# Patient Record
Sex: FEMALE | Race: WHITE | NOT HISPANIC OR LATINO | Employment: OTHER | URBAN - METROPOLITAN AREA
[De-identification: names, ages, dates, MRNs, and addresses within clinical notes are randomized per-mention and may not be internally consistent; named-entity substitution may affect disease eponyms.]

---

## 2017-06-14 ENCOUNTER — HOSPITAL ENCOUNTER (OUTPATIENT)
Dept: RADIOLOGY | Facility: MEDICAL CENTER | Age: 77
End: 2017-06-14
Attending: INTERNAL MEDICINE
Payer: MEDICARE

## 2017-06-14 DIAGNOSIS — C50.411 MALIGNANT NEOPLASM OF UPPER-OUTER QUADRANT OF RIGHT FEMALE BREAST (HCC): ICD-10-CM

## 2017-06-14 DIAGNOSIS — I82.442 DEEP VEIN THROMBOSIS (DVT) OF TIBIAL VEIN OF LEFT LOWER EXTREMITY, UNSPECIFIED CHRONICITY (HCC): ICD-10-CM

## 2017-06-14 PROCEDURE — 93971 EXTREMITY STUDY: CPT | Mod: LT

## 2020-03-20 ENCOUNTER — HOSPITAL ENCOUNTER (OUTPATIENT)
Dept: RADIOLOGY | Facility: MEDICAL CENTER | Age: 80
End: 2020-03-20
Attending: INTERNAL MEDICINE
Payer: MEDICARE

## 2020-03-20 DIAGNOSIS — C50.411 MALIGNANT NEOPLASM OF UPPER-OUTER QUADRANT OF RIGHT FEMALE BREAST, UNSPECIFIED ESTROGEN RECEPTOR STATUS (HCC): ICD-10-CM

## 2020-03-20 PROCEDURE — A9552 F18 FDG: HCPCS

## 2020-05-28 ENCOUNTER — APPOINTMENT (OUTPATIENT)
Dept: RADIOLOGY | Facility: MEDICAL CENTER | Age: 80
End: 2020-05-28
Attending: INTERNAL MEDICINE
Payer: MEDICARE

## 2020-05-28 DIAGNOSIS — Z17.0 MALIGNANT NEOPLASM OF UPPER-OUTER QUADRANT OF RIGHT BREAST IN FEMALE, ESTROGEN RECEPTOR POSITIVE (HCC): ICD-10-CM

## 2020-05-28 DIAGNOSIS — C50.411 MALIGNANT NEOPLASM OF UPPER-OUTER QUADRANT OF RIGHT BREAST IN FEMALE, ESTROGEN RECEPTOR POSITIVE (HCC): ICD-10-CM

## 2020-05-28 PROCEDURE — A9576 INJ PROHANCE MULTIPACK: HCPCS | Performed by: INTERNAL MEDICINE

## 2020-05-28 PROCEDURE — 700117 HCHG RX CONTRAST REV CODE 255: Performed by: INTERNAL MEDICINE

## 2020-05-28 PROCEDURE — 72197 MRI PELVIS W/O & W/DYE: CPT

## 2020-05-28 RX ADMIN — GADOTERIDOL 15 ML: 279.3 INJECTION, SOLUTION INTRAVENOUS at 13:33

## 2020-06-10 RX ORDER — SODIUM CHLORIDE 9 MG/ML
INJECTION, SOLUTION INTRAVENOUS CONTINUOUS
Status: CANCELLED | OUTPATIENT
Start: 2020-06-16

## 2020-06-11 ENCOUNTER — OFFICE VISIT (OUTPATIENT)
Dept: ADMISSIONS | Facility: MEDICAL CENTER | Age: 80
End: 2020-06-11
Attending: INTERNAL MEDICINE
Payer: MEDICARE

## 2020-06-11 DIAGNOSIS — Z01.812 PRE-OPERATIVE LABORATORY EXAMINATION: ICD-10-CM

## 2020-06-11 LAB — COVID ORDER STATUS COVID19: NORMAL

## 2020-06-11 PROCEDURE — C9803 HOPD COVID-19 SPEC COLLECT: HCPCS

## 2020-06-15 LAB
SARS-COV-2 RNA RESP QL NAA+PROBE: NOT DETECTED
SPECIMEN SOURCE: NORMAL

## 2020-06-15 RX ORDER — ASPIRIN 81 MG/1
81 TABLET, CHEWABLE ORAL DAILY
COMMUNITY

## 2020-06-15 NOTE — OR NURSING
COVID-19 Pre-Surgery Screenin. Do you have an undiagnosed respiratory illness or symptoms such as coughing or sneezing? (Yes/No) n     1. Onset of Sx: na     2. Acute vs. chronic respiratory illness: na     2. Do you have an unexplained fever greater than 100.4 degrees Fahrenheit or 38 degrees Celsius? (Yes/No) n  ?  3. Have you had direct exposure to a patient who tested positive for Covid-19? (Yes/No) n    4. Have you traveled within the last 14 days to [place], [place], [place], [place], or [place]? (Yes/No) n    Patient informed regarding visitation policy by this RN.

## 2020-06-16 ENCOUNTER — APPOINTMENT (OUTPATIENT)
Dept: RADIOLOGY | Facility: MEDICAL CENTER | Age: 80
End: 2020-06-16
Attending: INTERNAL MEDICINE
Payer: MEDICARE

## 2020-06-16 ENCOUNTER — HOSPITAL ENCOUNTER (OUTPATIENT)
Facility: MEDICAL CENTER | Age: 80
End: 2020-06-16
Attending: INTERNAL MEDICINE | Admitting: INTERNAL MEDICINE
Payer: MEDICARE

## 2020-06-16 VITALS
SYSTOLIC BLOOD PRESSURE: 137 MMHG | HEIGHT: 67 IN | BODY MASS INDEX: 26.57 KG/M2 | TEMPERATURE: 97.1 F | RESPIRATION RATE: 16 BRPM | WEIGHT: 169.31 LBS | HEART RATE: 71 BPM | OXYGEN SATURATION: 93 % | DIASTOLIC BLOOD PRESSURE: 69 MMHG

## 2020-06-16 DIAGNOSIS — C50.411 MALIGNANT NEOPLASM OF UPPER-OUTER QUADRANT OF RIGHT FEMALE BREAST, UNSPECIFIED ESTROGEN RECEPTOR STATUS (HCC): ICD-10-CM

## 2020-06-16 LAB
ERYTHROCYTE [DISTWIDTH] IN BLOOD BY AUTOMATED COUNT: 47.5 FL (ref 35.9–50)
HCT VFR BLD AUTO: 43.2 % (ref 37–47)
HGB BLD-MCNC: 14.5 G/DL (ref 12–16)
INR PPP: 0.98 (ref 0.87–1.13)
MCH RBC QN AUTO: 31.7 PG (ref 27–33)
MCHC RBC AUTO-ENTMCNC: 33.6 G/DL (ref 33.6–35)
MCV RBC AUTO: 94.5 FL (ref 81.4–97.8)
PATHOLOGY CONSULT NOTE: NORMAL
PLATELET # BLD AUTO: 137 K/UL (ref 164–446)
PMV BLD AUTO: 11.1 FL (ref 9–12.9)
PROTHROMBIN TIME: 13.1 SEC (ref 12–14.6)
RBC # BLD AUTO: 4.57 M/UL (ref 4.2–5.4)
WBC # BLD AUTO: 17 K/UL (ref 4.8–10.8)

## 2020-06-16 PROCEDURE — 99153 MOD SED SAME PHYS/QHP EA: CPT

## 2020-06-16 PROCEDURE — 700111 HCHG RX REV CODE 636 W/ 250 OVERRIDE (IP): Performed by: RADIOLOGY

## 2020-06-16 PROCEDURE — 85027 COMPLETE CBC AUTOMATED: CPT

## 2020-06-16 PROCEDURE — 88185 FLOWCYTOMETRY/TC ADD-ON: CPT | Mod: 91

## 2020-06-16 PROCEDURE — 88311 DECALCIFY TISSUE: CPT

## 2020-06-16 PROCEDURE — 88307 TISSUE EXAM BY PATHOLOGIST: CPT

## 2020-06-16 PROCEDURE — 88341 IMHCHEM/IMCYTCHM EA ADD ANTB: CPT | Mod: 91

## 2020-06-16 PROCEDURE — 700105 HCHG RX REV CODE 258: Performed by: NURSE PRACTITIONER

## 2020-06-16 PROCEDURE — 85610 PROTHROMBIN TIME: CPT

## 2020-06-16 PROCEDURE — 88342 IMHCHEM/IMCYTCHM 1ST ANTB: CPT

## 2020-06-16 PROCEDURE — 700111 HCHG RX REV CODE 636 W/ 250 OVERRIDE (IP)

## 2020-06-16 PROCEDURE — 160002 HCHG RECOVERY MINUTES (STAT)

## 2020-06-16 PROCEDURE — 88184 FLOWCYTOMETRY/ TC 1 MARKER: CPT

## 2020-06-16 RX ORDER — MIDAZOLAM HYDROCHLORIDE 1 MG/ML
INJECTION INTRAMUSCULAR; INTRAVENOUS
Status: COMPLETED
Start: 2020-06-16 | End: 2020-06-16

## 2020-06-16 RX ORDER — ONDANSETRON 2 MG/ML
4 INJECTION INTRAMUSCULAR; INTRAVENOUS PRN
Status: DISCONTINUED | OUTPATIENT
Start: 2020-06-16 | End: 2020-06-16 | Stop reason: HOSPADM

## 2020-06-16 RX ORDER — MIDAZOLAM HYDROCHLORIDE 1 MG/ML
.5-2 INJECTION INTRAMUSCULAR; INTRAVENOUS PRN
Status: DISCONTINUED | OUTPATIENT
Start: 2020-06-16 | End: 2020-06-16 | Stop reason: HOSPADM

## 2020-06-16 RX ORDER — HYDROMORPHONE HYDROCHLORIDE 2 MG/ML
0.25 INJECTION, SOLUTION INTRAMUSCULAR; INTRAVENOUS; SUBCUTANEOUS
Status: DISCONTINUED | OUTPATIENT
Start: 2020-06-16 | End: 2020-06-16 | Stop reason: HOSPADM

## 2020-06-16 RX ORDER — NALOXONE HYDROCHLORIDE 0.4 MG/ML
INJECTION, SOLUTION INTRAMUSCULAR; INTRAVENOUS; SUBCUTANEOUS
Status: DISCONTINUED
Start: 2020-06-16 | End: 2020-06-16 | Stop reason: HOSPADM

## 2020-06-16 RX ORDER — ONDANSETRON 2 MG/ML
4 INJECTION INTRAMUSCULAR; INTRAVENOUS EVERY 8 HOURS PRN
Status: DISCONTINUED | OUTPATIENT
Start: 2020-06-16 | End: 2020-06-16 | Stop reason: HOSPADM

## 2020-06-16 RX ORDER — OXYCODONE HYDROCHLORIDE 5 MG/1
2.5 TABLET ORAL
Status: DISCONTINUED | OUTPATIENT
Start: 2020-06-16 | End: 2020-06-16 | Stop reason: HOSPADM

## 2020-06-16 RX ORDER — SODIUM CHLORIDE 9 MG/ML
500 INJECTION, SOLUTION INTRAVENOUS
Status: DISCONTINUED | OUTPATIENT
Start: 2020-06-16 | End: 2020-06-16 | Stop reason: HOSPADM

## 2020-06-16 RX ORDER — SODIUM CHLORIDE 9 MG/ML
INJECTION, SOLUTION INTRAVENOUS CONTINUOUS
Status: DISCONTINUED | OUTPATIENT
Start: 2020-06-16 | End: 2020-06-16 | Stop reason: HOSPADM

## 2020-06-16 RX ADMIN — MIDAZOLAM HYDROCHLORIDE 1 MG: 1 INJECTION, SOLUTION INTRAMUSCULAR; INTRAVENOUS at 14:20

## 2020-06-16 RX ADMIN — SODIUM CHLORIDE: 9 INJECTION, SOLUTION INTRAVENOUS at 12:15

## 2020-06-16 RX ADMIN — FENTANYL CITRATE 50 MCG: 50 INJECTION INTRAMUSCULAR; INTRAVENOUS at 14:03

## 2020-06-16 RX ADMIN — MIDAZOLAM HYDROCHLORIDE 1 MG: 1 INJECTION, SOLUTION INTRAMUSCULAR; INTRAVENOUS at 14:03

## 2020-06-16 RX ADMIN — FENTANYL CITRATE 25 MCG: 50 INJECTION INTRAMUSCULAR; INTRAVENOUS at 14:11

## 2020-06-16 NOTE — PROGRESS NOTES
Patient is AO x 4, RASS 0, and denies pain/nausea.  VSS on RA.  Procedure site CDI.  POC reviewed, PIV verified, and safety protocols in place.  Patient tolerating PO fluids.  Daughter Zoe updated on patient status and notified to p/u patient at [facility] ~1645.

## 2020-06-16 NOTE — DISCHARGE INSTRUCTIONS
ACTIVITY: Rest and take it easy for the first 24 hours.  A responsible adult is recommended to remain with you during that time.  It is normal to feel sleepy.  We encourage you to not do anything that requires balance, judgment or coordination.    MILD FLU-LIKE SYMPTOMS ARE NORMAL. YOU MAY EXPERIENCE GENERALIZED MUSCLE ACHES, THROAT IRRITATION, HEADACHE AND/OR SOME NAUSEA.    FOR 24 HOURS DO NOT:  Drive, operate machinery or run household appliances.  Drink beer or alcoholic beverages.   Make important decisions or sign legal documents.    SPECIAL INSTRUCTIONS:   No lifting over 10 lbs for 5 days.  Do not soak site in hot tub, bath tub or swimming pool for 5 days  Needle Biopsy of the Bone  Introduction  A bone biopsy is a procedure in which a small sample of bone is removed. The sample is taken with a needle. Then, the bone sample is looked at under a microscope to check for abnormalities. The sample is usually taken from a bone that is close to the skin. This procedure may be done to check for various problems with the bone. You may need this procedure if imaging tests or blood tests have indicated a possible problem. This procedure may be done to help determine if a bone tumor is cancerous (malignant). A bone biopsy can help to diagnose problems such as:  · Tumors of the bone (sarcomas) and bone marrow (multiple myeloma).  · Bone that forms abnormally (Paget disease).  · Noncancerous (benign) bone cysts.  · Bony growths.  · Infections in the bone.  What are the risks?  Generally, this is a safe procedure. However, problems may occur, including:  · Excessive bleeding.  · Infection.  · Injury to surrounding tissue.  What happens before the procedure?  · Ask your health care provider about:  ¨ Changing or stopping your regular medicines. This is especially important if you are taking diabetes medicines or blood thinners.  ¨ Taking medicines such as aspirin and ibuprofen. These medicines can thin your blood. Do not  take these medicines before your procedure if your health care provider instructs you not to.  · Follow instructions from your health care provider about eating or drinking restrictions.  · Plan to have someone take you home after the procedure.  · If you go home right after the procedure, plan to have someone with you for 24 hours.  What happens during the procedure?  · An IV tube may be inserted into one of your veins.  · The injection site will be cleaned with a germ-killing solution (antiseptic).  · You will be given one or more of the following:  ¨ A medicine to help you relax (sedative).  ¨ A medicine to numb the area (local anesthetic).  · The sample of bone will be removed by putting a large needle through the skin and into the bone.  · The needle will be removed.  · A bandage (dressing) will be placed over the insertion site and taped in place.  The procedure may vary among health care providers and hospitals.  What happens after the procedure?  · Your blood pressure, heart rate, breathing rate, and blood oxygen level will be monitored often until the medicines you were given have worn off.  · Return to your normal activities as told by your health care provider.  Recovering at home  Once at home, follow any instructions you’re given:   · Take all medicines as directed.  · Care for the procedure site as instructed.  · Check for signs of infection at the procedure site (see below).  · Don't get the procedure site wet. Don't bathe or shower until your healthcare providers say it is OK to do so.  · Don't do any heavy lifting or other strenuous activities as directed.   Call the healthcare provider  Call your healthcare provider if you have any of the following:   · Fever of 100.4 ºF ( 38 ºC) or higher, or as directed by your healthcare provider   · Signs of infection at the procedure site, such as increased redness or swelling, warmth, or bad-smelling drainage   · Prolonged bleeding from the biopsy site    · Increasing pain, swelling, or numbness around the biopsy site   · New pain, numbness, or weakness in your leg on the side of the biopsy.     This information is not intended to replace advice given to you by your health care provider. Make sure you discuss any questions you have with your health care provider.  Document Released: 10/26/2005 Document Revised: 05/25/2017 Document Reviewed: 01/25/2016  © 2017 Elsevier    DIET: To avoid nausea, slowly advance diet as tolerated, avoiding spicy or greasy foods for the first day.  Add more substantial food to your diet according to your physician's instructions. INCREASE FLUIDS AND FIBER TO AVOID CONSTIPATION.    SURGICAL DRESSING/BATHING: leave dressing in place for 24 hours, may shower once removed     FOLLOW-UP APPOINTMENT:  A follow-up appointment should be arranged with your Dr. Garces [phone]; call to schedule.    You should CALL YOUR PHYSICIAN if you develop:  Fever greater than 101 degrees F.  Pain not relieved by medication, or persistent nausea or vomiting.  Excessive bleeding (blood soaking through dressing) or unexpected drainage from the wound.  Extreme redness or swelling around the incision site, drainage of pus or foul smelling drainage.  Inability to urinate or empty your bladder within 8 hours.  Problems with breathing or chest pain.    You should call 911 if you develop problems with breathing or chest pain.  If you are unable to contact your doctor or surgical center, you should go to the nearest emergency room or urgent care center.  Physician's telephone #: [phone]    If any questions arise, call your doctor.  If your doctor is not available, please feel free to call the Surgical Center at [phone].  The Center is open Monday through Friday from 7AM to 7PM.  You can also call the [facility] HOTLINE open 24 hours/day, 7 days/week and speak to a nurse at [phone], or toll free at [phone].    A registered nurse may call you a few  days after your surgery to see how you are doing after your procedure.    MEDICATIONS: Resume taking daily medication.  Take prescribed pain medication with food.  If no medication is prescribed, you may take non-aspirin pain medication if needed.  PAIN MEDICATION CAN BE VERY CONSTIPATING.  Take a stool softener or laxative such as senokot, pericolace, or milk of magnesia if needed.    If your physician has prescribed pain medication that includes Acetaminophen (Tylenol), do not take additional Acetaminophen (Tylenol) while taking the prescribed medication.    Depression / Suicide Risk    As you are discharged from this [facility] facility, it is important to learn how to keep safe from harming yourself.    Recognize the warning signs:  · Abrupt changes in personality, positive or negative- including increase in energy   · Giving away possessions  · Change in eating patterns- significant weight changes-  positive or negative  · Change in sleeping patterns- unable to sleep or sleeping all the time   · Unwillingness or inability to communicate  · Depression  · Unusual sadness, discouragement and loneliness  · Talk of wanting to die  · Neglect of personal appearance   · Rebelliousness- reckless behavior  · Withdrawal from people/activities they love  · Confusion- inability to concentrate     If you or a loved one observes any of these behaviors or has concerns about self-harm, here's what you can do:  · Talk about it- your feelings and reasons for harming yourself  · Remove any means that you might use to hurt yourself (examples: pills, rope, extension cords, firearm)  · Get professional help from the community (Mental Health, Substance Abuse, psychological counseling)  · Do not be alone:Call your Safe Contact- someone whom you trust who will be there for you.  · Call your local CRISIS HOTLINE 481-7463 or 458-706-1549  · Call your local Children's Mobile Crisis Response Team [place] (453) 833-1511 or  www.[facility]  · Call the toll free National Suicide Prevention Hotlines   · National Suicide Prevention Lifeline 711-561-MYAZ (6783)  · National Hope Line Network 800-SUICIDE (843-7781)

## 2020-06-16 NOTE — PROGRESS NOTES
IR Nursing Note    Patient underwent a biopsy of the right iliac crest region by Dr. Bello. Procedure site was marked by MD and verified using imaging guidance. Patient was placed in a prone position. Vitals were taken every 5 minutes and remained stable during procedure (see doc flow sheet for results). CO2 waveform capnography was monitored and remained 17-38 throughout procedure. Patient appeared to tolerate the procedure well. A gauze dressing with tegadermwas placed over surgical site. Report called to Carlos CHRISTIANSEN. Pt transported by ruddy with RN to [facility] PACU. Core Labs X 10 hand delivered to lab by Kimberly MURILLO; 8 in fornalin, 2 in RPMI

## 2020-06-16 NOTE — H&P
History and Physical    Date: 6/16/2020    PCP: Rema Bowens M.D.      CC: RIGHT ILIAC BONE LESION ON CT-PET AND MRI    HPI: This is a 80 y.o. female who is presenting FOR CT GUIDED BONE BIOPSY RIGHT ILIAC BONE    Past Medical History:   Diagnosis Date   • Arthritis    • Blood clotting disorder (HCC)     clot behind left knee   • Bowel habit changes     constipation sometimes   • Cancer (HCC)     right breast   • CATARACT 2008    removed bilat   • Clostridium difficile infection 2010   • Diabetes     diet and exercise controlled, no meds   • Factor V deficiency (HCC)    • Gynecological disorder     hysterectomy, one ovary   • Pain     arthritis and back, 5/10   • Pneumonia    • Urinary bladder disorder     frequent UTI       Past Surgical History:   Procedure Laterality Date   • LUMPECTOMY  6/22/2012    Performed by JON LYONS at [facility]   • NODE BIOPSY SENTINEL  6/22/2012    Performed by JON LYONS at [facility]   • AXILLARY NODE DISSECTION  6/22/2012    Performed by JON LYONS at [facility]   • BREAST BIOPSY  06-06-12   • CATARACT EXTRACTION WITH IOL Bilateral 2008   • HYSTERECTOMY LAPAROSCOPY     • MENISCUS REPAIR Right    • NEUROMA EXCISION      foot       Current Facility-Administered Medications   Medication Dose Route Frequency Provider Last Rate Last Dose   • NS infusion   Intravenous Continuous Paulette L Raji, A.P.N. 125 mL/hr at 06/16/20 1215          Social History     Socioeconomic History   • Marital status:      Spouse name: Not on file   • Number of children: Not on file   • Years of education: Not on file   • Highest education level: Not on file   Occupational History   • Not on file   Social Needs   • Financial resource strain: Not on file   • Food insecurity     Worry: Not on file     Inability: Not on file   • Transportation needs     Medical: Not on file     Non-medical: Not on file   Tobacco Use   •  Smoking status: Former Smoker     Packs/day: 2.00     Years: 33.00     Pack years: 66.00     Last attempt to quit: 1990     Years since quittin.4   • Smokeless tobacco: Never Used   Substance and Sexual Activity   • Alcohol use: No   • Drug use: No   • Sexual activity: Not on file   Lifestyle   • Physical activity     Days per week: Not on file     Minutes per session: Not on file   • Stress: Not on file   Relationships   • Social connections     Talks on phone: Not on file     Gets together: Not on file     Attends Cheondoism service: Not on file     Active member of club or organization: Not on file     Attends meetings of clubs or organizations: Not on file     Relationship status: Not on file   • Intimate partner violence     Fear of current or ex partner: Not on file     Emotionally abused: Not on file     Physically abused: Not on file     Forced sexual activity: Not on file   Other Topics Concern   • Not on file   Social History Narrative   • Not on file       History reviewed. No pertinent family history.    Allergies:  Other drug; Other misc; and Tape    Review of Systems:  Negative except for GIVES HX OF PNEUMONIA, SEPSIS, UTI IN FEBRUARY. WONDERS IF SHE HAD COVID. NOW HAS ONLY MILD FATIGUE. ALSO GIVES HX OF BREAST CA AND CLL. DENIES PELVIC BONE PAIN.     Physical Exam   Constitutional: She appears well-developed and well-nourished.   HENT:   Head: Normocephalic and atraumatic.   Cardiovascular: Regular rhythm.   Pulmonary/Chest: Breath sounds normal.   Neurological: She is alert.   Psychiatric: She has a normal mood and affect. Her behavior is normal.       Vital Signs  Blood Pressure : 144/75   Temperature: 36.8 °C (98.3 °F)   Pulse: 85   Respiration: 18   Pulse Oximetry: 95 %        Labs:  Recent Labs     20  1218   WBC 17.0*   RBC 4.57   HEMOGLOBIN 14.5   HEMATOCRIT 43.2   MCV 94.5   MCH 31.7   MCHC 33.6   RDW 47.5   PLATELETCT 137*   MPV 11.1         Recent Labs     20  1218   INR  0.98     Recent Labs     06/16/20  1218   INR 0.98       Radiology:  No orders to display             Assessment and Plan:This is a 80 y.o. HERE FOR CT GUIDED PELVIC BONE BX

## 2020-06-16 NOTE — OR SURGEON
Immediate Post- Operative Note        PostOp Diagnosis: RIGHT ILIAC BONE LESION WITH MARROW REPLACEMENT. HX OF CLL AND BREAST CA      Procedure(s): CT GUIDED DEEP BONE BX AND MARROW ASPIRATE    14G TREPHINE CORES X 3 IN FORMALIN    18G CORES X 5 (FORMALIN X4, RPMI X 1)    13G ATTEMPTED CORE (MARROW CLOT ASPIRATE) IN RPMI    10 ML MARROW ASPIRATE SUBMITTED TO PATHOLOGY BONE MARROW TECH      Estimated Blood Loss: Less than 20 ml        Complications: None            6/16/2020     2:34 PM     Karlos Bello M.D.

## 2020-12-16 ENCOUNTER — HOSPITAL ENCOUNTER (OUTPATIENT)
Dept: RADIOLOGY | Facility: MEDICAL CENTER | Age: 80
End: 2020-12-16
Attending: INTERNAL MEDICINE
Payer: MEDICARE

## 2020-12-16 ENCOUNTER — APPOINTMENT (OUTPATIENT)
Dept: RADIOLOGY | Facility: MEDICAL CENTER | Age: 80
End: 2020-12-16
Attending: EMERGENCY MEDICINE
Payer: MEDICARE

## 2020-12-16 ENCOUNTER — HOSPITAL ENCOUNTER (OUTPATIENT)
Facility: MEDICAL CENTER | Age: 80
End: 2020-12-17
Attending: EMERGENCY MEDICINE | Admitting: STUDENT IN AN ORGANIZED HEALTH CARE EDUCATION/TRAINING PROGRAM
Payer: MEDICARE

## 2020-12-16 DIAGNOSIS — R06.02 SHORTNESS OF BREATH: ICD-10-CM

## 2020-12-16 DIAGNOSIS — R07.9 CHEST PAIN, UNSPECIFIED TYPE: ICD-10-CM

## 2020-12-16 DIAGNOSIS — C50.411 MALIGNANT NEOPLASM OF UPPER-OUTER QUADRANT OF RIGHT FEMALE BREAST, UNSPECIFIED ESTROGEN RECEPTOR STATUS (HCC): ICD-10-CM

## 2020-12-16 DIAGNOSIS — I26.99 PULMONARY EMBOLISM, UNSPECIFIED CHRONICITY, UNSPECIFIED PULMONARY EMBOLISM TYPE, UNSPECIFIED WHETHER ACUTE COR PULMONALE PRESENT (HCC): ICD-10-CM

## 2020-12-16 PROBLEM — C91.10 CLL (CHRONIC LYMPHOCYTIC LEUKEMIA) (HCC): Status: ACTIVE | Noted: 2020-12-16

## 2020-12-16 PROBLEM — E87.1 HYPONATREMIA: Status: ACTIVE | Noted: 2020-12-16

## 2020-12-16 PROBLEM — R79.89 ELEVATED TROPONIN: Status: ACTIVE | Noted: 2020-12-16

## 2020-12-16 LAB
ALBUMIN SERPL BCP-MCNC: 4.1 G/DL (ref 3.2–4.9)
ALBUMIN/GLOB SERPL: 1.9 G/DL
ALP SERPL-CCNC: 76 U/L (ref 30–99)
ALT SERPL-CCNC: 13 U/L (ref 2–50)
ANION GAP SERPL CALC-SCNC: 12 MMOL/L (ref 7–16)
APTT PPP: 23.2 SEC (ref 24.7–36)
AST SERPL-CCNC: 18 U/L (ref 12–45)
BILIRUB SERPL-MCNC: 0.5 MG/DL (ref 0.1–1.5)
BUN SERPL-MCNC: 18 MG/DL (ref 8–22)
CALCIUM SERPL-MCNC: 8.9 MG/DL (ref 8.5–10.5)
CHLORIDE SERPL-SCNC: 101 MMOL/L (ref 96–112)
CO2 SERPL-SCNC: 21 MMOL/L (ref 20–33)
COVID ORDER STATUS COVID19: NORMAL
CREAT SERPL-MCNC: 1.06 MG/DL (ref 0.5–1.4)
EKG IMPRESSION: NORMAL
EKG IMPRESSION: NORMAL
FLUAV RNA SPEC QL NAA+PROBE: NEGATIVE
FLUBV RNA SPEC QL NAA+PROBE: NEGATIVE
GLOBULIN SER CALC-MCNC: 2.2 G/DL (ref 1.9–3.5)
GLUCOSE SERPL-MCNC: 97 MG/DL (ref 65–99)
INR PPP: 1 (ref 0.87–1.13)
POTASSIUM SERPL-SCNC: 4.7 MMOL/L (ref 3.6–5.5)
PROT SERPL-MCNC: 6.3 G/DL (ref 6–8.2)
PROTHROMBIN TIME: 13.5 SEC (ref 12–14.6)
RSV RNA SPEC QL NAA+PROBE: NEGATIVE
SARS-COV-2 RNA RESP QL NAA+PROBE: NOTDETECTED
SODIUM SERPL-SCNC: 134 MMOL/L (ref 135–145)
SPECIMEN SOURCE: NORMAL
TROPONIN T SERPL-MCNC: 22 NG/L (ref 6–19)

## 2020-12-16 PROCEDURE — 0241U HCHG SARS-COV-2 COVID-19 NFCT DS RESP RNA 4 TRGT MIC: CPT

## 2020-12-16 PROCEDURE — 700117 HCHG RX CONTRAST REV CODE 255: Performed by: INTERNAL MEDICINE

## 2020-12-16 PROCEDURE — G0378 HOSPITAL OBSERVATION PER HR: HCPCS

## 2020-12-16 PROCEDURE — 99285 EMERGENCY DEPT VISIT HI MDM: CPT

## 2020-12-16 PROCEDURE — 85610 PROTHROMBIN TIME: CPT

## 2020-12-16 PROCEDURE — 99218 PR INITIAL OBSERVATION CARE,LEVL I: CPT | Mod: AI | Performed by: STUDENT IN AN ORGANIZED HEALTH CARE EDUCATION/TRAINING PROGRAM

## 2020-12-16 PROCEDURE — 80053 COMPREHEN METABOLIC PANEL: CPT

## 2020-12-16 PROCEDURE — 84484 ASSAY OF TROPONIN QUANT: CPT | Mod: 91

## 2020-12-16 PROCEDURE — C9803 HOPD COVID-19 SPEC COLLECT: HCPCS | Performed by: EMERGENCY MEDICINE

## 2020-12-16 PROCEDURE — 85730 THROMBOPLASTIN TIME PARTIAL: CPT

## 2020-12-16 PROCEDURE — 93005 ELECTROCARDIOGRAM TRACING: CPT | Performed by: EMERGENCY MEDICINE

## 2020-12-16 PROCEDURE — 93005 ELECTROCARDIOGRAM TRACING: CPT

## 2020-12-16 PROCEDURE — 700111 HCHG RX REV CODE 636 W/ 250 OVERRIDE (IP): Performed by: EMERGENCY MEDICINE

## 2020-12-16 PROCEDURE — 71045 X-RAY EXAM CHEST 1 VIEW: CPT

## 2020-12-16 PROCEDURE — 71260 CT THORAX DX C+: CPT

## 2020-12-16 RX ORDER — ONDANSETRON 2 MG/ML
4 INJECTION INTRAMUSCULAR; INTRAVENOUS EVERY 4 HOURS PRN
Status: DISCONTINUED | OUTPATIENT
Start: 2020-12-16 | End: 2020-12-17 | Stop reason: HOSPADM

## 2020-12-16 RX ORDER — BISACODYL 10 MG
10 SUPPOSITORY, RECTAL RECTAL
Status: DISCONTINUED | OUTPATIENT
Start: 2020-12-16 | End: 2020-12-17 | Stop reason: HOSPADM

## 2020-12-16 RX ORDER — AMOXICILLIN 250 MG
2 CAPSULE ORAL 2 TIMES DAILY
Status: DISCONTINUED | OUTPATIENT
Start: 2020-12-16 | End: 2020-12-17 | Stop reason: HOSPADM

## 2020-12-16 RX ORDER — ACETAMINOPHEN 325 MG/1
650 TABLET ORAL EVERY 6 HOURS PRN
Status: DISCONTINUED | OUTPATIENT
Start: 2020-12-16 | End: 2020-12-17 | Stop reason: HOSPADM

## 2020-12-16 RX ORDER — LORATADINE 10 MG/1
10 TABLET ORAL DAILY
COMMUNITY

## 2020-12-16 RX ORDER — POLYETHYLENE GLYCOL 3350 17 G/17G
1 POWDER, FOR SOLUTION ORAL
Status: DISCONTINUED | OUTPATIENT
Start: 2020-12-16 | End: 2020-12-17 | Stop reason: HOSPADM

## 2020-12-16 RX ORDER — ONDANSETRON 4 MG/1
4 TABLET, ORALLY DISINTEGRATING ORAL EVERY 4 HOURS PRN
Status: DISCONTINUED | OUTPATIENT
Start: 2020-12-16 | End: 2020-12-17 | Stop reason: HOSPADM

## 2020-12-16 RX ORDER — FLUTICASONE PROPIONATE 50 MCG
2 SPRAY, SUSPENSION (ML) NASAL DAILY
COMMUNITY

## 2020-12-16 RX ADMIN — IOHEXOL 100 ML: 350 INJECTION, SOLUTION INTRAVENOUS at 11:34

## 2020-12-16 RX ADMIN — IOHEXOL 25 ML: 240 INJECTION, SOLUTION INTRATHECAL; INTRAVASCULAR; INTRAVENOUS; ORAL at 11:35

## 2020-12-16 RX ADMIN — ENOXAPARIN SODIUM 80 MG: 80 INJECTION SUBCUTANEOUS at 17:56

## 2020-12-16 NOTE — ED TRIAGE NOTES
"Chief Complaint   Patient presents with   • Shortness of Breath     X months, worse this week with fatigue with ADL's, states possible COPD. Denies    • Pulmonary Embolism     Seen today on CT, sent to ER by MD. Dx with DVT two years ago LLE. Pt only on baby aspirin, no other thinners, previously on xarelto. States some chest 'tightness'   • Sent by MD     For PE     Pt arrives from CT scan at this hospital by MD request for dx of PE, results in pt chart. VSS on RA, NAD, GCS 15.     [facility] Educ[facility] on [facility]age process and to inform staff of any changes.     Denies all s/sx of covid, denies recent travel, denies fevers.    BP (!) 170/87   Pulse 92   Temp 36.3 °C (97.3 °F) (Temporal)   Resp 16   Ht 1.676 m (5' 6\")   Wt 77.1 kg (170 lb)   SpO2 97%   BMI 27.44 kg/m²   "

## 2020-12-17 ENCOUNTER — PHARMACY VISIT (OUTPATIENT)
Dept: PHARMACY | Facility: MEDICAL CENTER | Age: 80
End: 2020-12-17
Payer: COMMERCIAL

## 2020-12-17 ENCOUNTER — APPOINTMENT (OUTPATIENT)
Dept: RADIOLOGY | Facility: MEDICAL CENTER | Age: 80
End: 2020-12-17
Attending: STUDENT IN AN ORGANIZED HEALTH CARE EDUCATION/TRAINING PROGRAM
Payer: MEDICARE

## 2020-12-17 VITALS
RESPIRATION RATE: 20 BRPM | SYSTOLIC BLOOD PRESSURE: 136 MMHG | OXYGEN SATURATION: 96 % | HEART RATE: 82 BPM | HEIGHT: 66 IN | TEMPERATURE: 98.1 F | WEIGHT: 170 LBS | BODY MASS INDEX: 27.32 KG/M2 | DIASTOLIC BLOOD PRESSURE: 74 MMHG

## 2020-12-17 PROBLEM — R79.89 ELEVATED TROPONIN: Status: RESOLVED | Noted: 2020-12-16 | Resolved: 2020-12-17

## 2020-12-17 LAB
ANION GAP SERPL CALC-SCNC: 8 MMOL/L (ref 7–16)
BASOPHILS # BLD AUTO: 0 % (ref 0–1.8)
BASOPHILS # BLD: 0 K/UL (ref 0–0.12)
BUN SERPL-MCNC: 19 MG/DL (ref 8–22)
CALCIUM SERPL-MCNC: 8.6 MG/DL (ref 8.5–10.5)
CHLORIDE SERPL-SCNC: 107 MMOL/L (ref 96–112)
CO2 SERPL-SCNC: 25 MMOL/L (ref 20–33)
CREAT SERPL-MCNC: 1.19 MG/DL (ref 0.5–1.4)
EOSINOPHIL # BLD AUTO: 0 K/UL (ref 0–0.51)
EOSINOPHIL NFR BLD: 0 % (ref 0–6.9)
ERYTHROCYTE [DISTWIDTH] IN BLOOD BY AUTOMATED COUNT: 49.4 FL (ref 35.9–50)
GLUCOSE SERPL-MCNC: 89 MG/DL (ref 65–99)
HCT VFR BLD AUTO: 38.2 % (ref 37–47)
HGB BLD-MCNC: 12.7 G/DL (ref 12–16)
LYMPHOCYTES # BLD AUTO: 17.03 K/UL (ref 1–4.8)
LYMPHOCYTES NFR BLD: 85.6 % (ref 22–41)
MANUAL DIFF BLD: NORMAL
MCH RBC QN AUTO: 32.3 PG (ref 27–33)
MCHC RBC AUTO-ENTMCNC: 33.2 G/DL (ref 33.6–35)
MCV RBC AUTO: 97.2 FL (ref 81.4–97.8)
MONOCYTES # BLD AUTO: 0.5 K/UL (ref 0–0.85)
MONOCYTES NFR BLD AUTO: 2.5 % (ref 0–13.4)
MORPHOLOGY BLD-IMP: NORMAL
MYELOCYTES NFR BLD MANUAL: 1.7 %
NEUTROPHILS # BLD AUTO: 2.03 K/UL (ref 2–7.15)
NEUTROPHILS NFR BLD: 10.2 % (ref 44–72)
NRBC # BLD AUTO: 0.04 K/UL
NRBC BLD-RTO: 0.2 /100 WBC
PLATELET # BLD AUTO: 118 K/UL (ref 164–446)
PLATELET BLD QL SMEAR: NORMAL
PMV BLD AUTO: 11.7 FL (ref 9–12.9)
POTASSIUM SERPL-SCNC: 4.5 MMOL/L (ref 3.6–5.5)
RBC # BLD AUTO: 3.93 M/UL (ref 4.2–5.4)
RBC BLD AUTO: NORMAL
SODIUM SERPL-SCNC: 140 MMOL/L (ref 135–145)
TROPONIN T SERPL-MCNC: 22 NG/L (ref 6–19)
WBC # BLD AUTO: 19.9 K/UL (ref 4.8–10.8)

## 2020-12-17 PROCEDURE — 99285 EMERGENCY DEPT VISIT HI MDM: CPT

## 2020-12-17 PROCEDURE — 93005 ELECTROCARDIOGRAM TRACING: CPT | Performed by: STUDENT IN AN ORGANIZED HEALTH CARE EDUCATION/TRAINING PROGRAM

## 2020-12-17 PROCEDURE — 700102 HCHG RX REV CODE 250 W/ 637 OVERRIDE(OP): Performed by: STUDENT IN AN ORGANIZED HEALTH CARE EDUCATION/TRAINING PROGRAM

## 2020-12-17 PROCEDURE — A9270 NON-COVERED ITEM OR SERVICE: HCPCS | Performed by: STUDENT IN AN ORGANIZED HEALTH CARE EDUCATION/TRAINING PROGRAM

## 2020-12-17 PROCEDURE — RXMED WILLOW AMBULATORY MEDICATION CHARGE: Performed by: NURSE PRACTITIONER

## 2020-12-17 PROCEDURE — 80048 BASIC METABOLIC PNL TOTAL CA: CPT

## 2020-12-17 PROCEDURE — 96372 THER/PROPH/DIAG INJ SC/IM: CPT

## 2020-12-17 PROCEDURE — 85027 COMPLETE CBC AUTOMATED: CPT

## 2020-12-17 PROCEDURE — G0378 HOSPITAL OBSERVATION PER HR: HCPCS

## 2020-12-17 PROCEDURE — 700111 HCHG RX REV CODE 636 W/ 250 OVERRIDE (IP): Performed by: STUDENT IN AN ORGANIZED HEALTH CARE EDUCATION/TRAINING PROGRAM

## 2020-12-17 PROCEDURE — 700102 HCHG RX REV CODE 250 W/ 637 OVERRIDE(OP): Performed by: NURSE PRACTITIONER

## 2020-12-17 PROCEDURE — 85007 BL SMEAR W/DIFF WBC COUNT: CPT

## 2020-12-17 PROCEDURE — 74018 RADEX ABDOMEN 1 VIEW: CPT

## 2020-12-17 PROCEDURE — 99239 HOSP IP/OBS DSCHRG MGMT >30: CPT | Performed by: STUDENT IN AN ORGANIZED HEALTH CARE EDUCATION/TRAINING PROGRAM

## 2020-12-17 PROCEDURE — A9270 NON-COVERED ITEM OR SERVICE: HCPCS | Performed by: NURSE PRACTITIONER

## 2020-12-17 RX ORDER — SIMETHICONE 80 MG
80 TABLET,CHEWABLE ORAL 3 TIMES DAILY PRN
Status: DISCONTINUED | OUTPATIENT
Start: 2020-12-17 | End: 2020-12-17 | Stop reason: HOSPADM

## 2020-12-17 RX ADMIN — SIMETHICONE 80 MG: 80 TABLET, CHEWABLE ORAL at 17:52

## 2020-12-17 RX ADMIN — DOCUSATE SODIUM 50 MG AND SENNOSIDES 8.6 MG 2 TABLET: 8.6; 5 TABLET, FILM COATED ORAL at 05:52

## 2020-12-17 RX ADMIN — APIXABAN 10 MG: 5 TABLET, FILM COATED ORAL at 17:53

## 2020-12-17 RX ADMIN — ENOXAPARIN SODIUM 80 MG: 80 INJECTION SUBCUTANEOUS at 05:43

## 2020-12-17 NOTE — H&P
Hospital Medicine History & Physical Note    Date of Service  12/16/2020    Primary Care Physician  Rema Bowens M.D.    Consultants  Oncology - Dr. Cartwright.     Code Status  Full Code    Chief Complaint  Chief Complaint   Patient presents with   • Shortness of Breath     X months, worse this week with fatigue with ADL's, states possible COPD. Denies    • Pulmonary Embolism     Seen today on CT, sent to ER by MD. Dx with DVT two years ago LLE. Pt only on baby aspirin, no other thinners, previously on xarelto. States some chest 'tightness'   • Sent by MD     For PE       History of Presenting Illness  80 y.o. female with past medical history of factor V Leiden on 81 mg aspirin daily, previous h/o DVT (previously on Xarelto), remote history of breast cancer, present history of CLL who presents after she was told that she had a pulmonary embolism seen on a CT today.  Her outpatient oncologist, Dr. Garces, following her for CLL had ordered a PET, CT, and bone scan for her, which she was doing today.  After her CT chest scan, she was told that she had a PE and needed to report to the ED.  Denies pleuritic chest pain, but reports shortness of breath for a few weeks now.  Denies palpitations, abdominal pain, headache, fever/chills.      In the ED patient saturating well on room air, initially hypertensive to 170/87 but down to 139/68 on my exam.  The patient is charted as tachypneic to 38, but RR 22 on my exam.  Troponin 22.  EKG showed sinus rhythm with APC present.    ED physician, Dr. Mesa, consulted Oncology, Dr. Cartwright, who agrees with therapeutic Lovenox for patient's PE at this point.  Dr. Cartwright to see patient tomorrow morning per EDP.     Review of Systems  12 point review of systems negative except as per HPI.    Past Medical History   has a past medical history of Arthritis, Blood clotting disorder (HCC), Bowel habit changes, Cancer (HCC) (), CATARACT (2008), Clostridium difficile infection  "(2010), Diabetes, Factor V deficiency (HCC), Gynecological disorder, Pain, Pneumonia, and Urinary bladder disorder.    Surgical History   has a past surgical history that includes breast biopsy (06-06-12); lumpectomy (6/22/2012); node biopsy sentinel (6/22/2012); axillary node dissection (6/22/2012); cataract extraction with iol (Bilateral, 2008); hysterectomy laparoscopy; meniscus repair (Right); and neuroma excision.     Family History  family history is not on file.     Social History   reports that she quit smoking about 30 years ago. She has a 66.00 pack-year smoking history. She has never used smokeless tobacco. She reports that she does not drink alcohol or use drugs.    Allergies  Allergies   Allergen Reactions   • Other Drug      B/p meds cause lightheadness and anxious.   • Other Misc      \"Antibiotics give me C DIFF\"   • Tape Contact Dermatitis       Medications  Prior to Admission Medications   Prescriptions Last Dose Informant Patient Reported? Taking?   aspirin (ASA) 81 MG Chew Tab chewable tablet 12/16/2020 at 0900 Patient Yes No   Sig: Take 81 mg by mouth every day.   azithromycin (ZITHROMAX) 250 MG TABS Not Taking at Unknown time Patient No No   Sig: Use SHIRA as directed   Patient not taking: Reported on 12/16/2020   azithromycin (ZITHROMAX) 250 MG TABS Not Taking at Unknown time Patient No No   Sig: Use SHIRA as directed   Patient not taking: Reported on 12/16/2020   fluticasone (FLONASE) 50 MCG/ACT nasal spray 12/16/2020 at 0900  Yes Yes   Sig: Administer 2 Sprays into affected nostril(S) every day.   loratadine (CLARITIN) 10 MG Tab 12/16/2020 at 0900  Yes Yes   Sig: Take 10 mg by mouth every day.   rivaroxaban (XARELTO) 15 MG TABS tablet Not Taking at Unknown time Patient No No   Sig: Take 1 Tab by mouth 2 times a day, with meals.   Patient not taking: Reported on 12/16/2020      Facility-Administered Medications: None       Physical Exam  Temp:  [36.3 °C (97.3 °F)] 36.3 °C (97.3 °F)  Pulse:  " [85-92] 85  Resp:  [16-17] 17  BP: (145-170)/(71-87) 145/71  SpO2:  [94 %-97 %] 94 %    Physical Exam  Vitals signs and nursing note reviewed.   Constitutional:       General: She is not in acute distress.     Appearance: She is not toxic-appearing or diaphoretic.   HENT:      Head: Normocephalic and atraumatic.      Nose: No congestion.      Mouth/Throat:      Mouth: Mucous membranes are moist.      Pharynx: Oropharynx is clear.   Eyes:      General: No scleral icterus.     Conjunctiva/sclera: Conjunctivae normal.   Cardiovascular:      Rate and Rhythm: Normal rate and regular rhythm.      Pulses: Normal pulses.      Heart sounds: Normal heart sounds. No murmur.   Pulmonary:      Effort: Pulmonary effort is normal. No respiratory distress.      Breath sounds: Normal breath sounds. No stridor. No wheezing.      Comments: No appreciable respiratory distress.  Abdominal:      General: Bowel sounds are normal.      Palpations: Abdomen is soft.      Tenderness: There is no abdominal tenderness.   Musculoskeletal:         General: No swelling or tenderness.   Skin:     Capillary Refill: Capillary refill takes less than 2 seconds.      Coloration: Skin is not jaundiced.   Neurological:      Mental Status: She is alert and oriented to person, place, and time. Mental status is at baseline.   Psychiatric:         Mood and Affect: Mood normal.         Behavior: Behavior normal.           Laboratory:      Recent Labs     12/16/20  1607   SODIUM 134*   POTASSIUM 4.7   CHLORIDE 101   CO2 21   GLUCOSE 97   BUN 18   CREATININE 1.06   CALCIUM 8.9     Recent Labs     12/16/20  1607   ALTSGPT 13   ASTSGOT 18   ALKPHOSPHAT 76   TBILIRUBIN 0.5   GLUCOSE 97     Recent Labs     12/16/20  1607   APTT 23.2*   INR 1.00     No results for input(s): NTPROBNP in the last 72 hours.      Recent Labs     12/16/20  1607   TROPONINT 22*       Imaging:  DX-CHEST-PORTABLE (1 VIEW)   Final Result      1.  There is no acute cardiopulmonary process.     "        Assessment/Plan:  I anticipate this patient is appropriate for observation status at this time.    * Nonocclusive thrombi in the left pulmonary arteries  Assessment & Plan  \"Nonocclusive thrombi in the left pulmonary arteries, likely subacute,\" seen on CT chest/abdomen/pelvis today.  In the setting of present CLL and past h/o breast cancer, Factor V Leiden.    Oncology, Dr. Cartwright, onboard; recs: therapeutic Lovenox.  Dr. Cartwright to see patient tomorrow morning per EDP.   Therapeutic Lovenox.      CLL (chronic lymphocytic leukemia) (HCC)  Assessment & Plan  Followed by outpatient oncologist, Dr. Garces.  Planned for PET, CT, and bone scan today.  This was halted due to findings of PE (see above).    CT chest/abd/pelvis today showed \"enlarged bilateral axillary, bilateral hilar, mediastinal, retroperitoneal, bilateral external iliac lymph nodes, concerning for lymphoma.\"  Oncology, Dr. Cartwright, onboard; will see patient tomorrow morning.  Further recs pending.     Elevated troponin  Assessment & Plan  Initial troponin 22.  EKG showed sinus rhythm with APC present.  Patient without chest pain.  Likely secondary to demand ischemia.  Trend troponin, EKG x1.  Monitor on telemetry.    Hyponatremia- (present on admission)  Assessment & Plan  Sodium 134.  Asymptomatic.  Monitor BMP.    "

## 2020-12-17 NOTE — DISCHARGE PLANNING
Received call from Sue BANEGAS to see if prior auth is needed for Eliquis. Called [facility], spoke with Oralia who states the medication has went through the insurance, no prior auth is needed. The starter pac Rx is $47.00 and then following the price for 90 days is $140.00. The pharmacy will be delivering the medication by 4pm today.  Patient updated with pricing information

## 2020-12-17 NOTE — DISCHARGE PLANNING
Care Transition Team Assessment  Met with patient for assessment. Lives alone, has 2 daughters in the area that can assist if needed. Uses a cane once in  Awhile for ambulation. Prescriptions filled at [facility] on [address]      Information Source  Orientation : Oriented x 4  Information Given By: Patient  Informant's Name: Nanette  Who is responsible for making decisions for patient? : Patient    Readmission Evaluation  Is this a readmission?: No    Elopement Risk  Legal Hold: No    Interdisciplinary Discharge Planning  Primary Care Physician: Rema Bowens  Lives with - Patient's Self Care Capacity: Alone and Able to Care For Self  Patient or legal guardian wants to designate a caregiver: No  Support Systems: Children  Durable Medical Equipment: Other - Specify(cane)    Discharge Preparedness  What is your plan after discharge?: Uncertain - pending medical team collaboration  What are your discharge supports?: Child  Prior Functional Level: Drives Self, Independent with Activities of Daily Living    Functional Assesment  Prior Functional Level: Drives Self, Independent with Activities of Daily Living    Finances  Financial Barriers to Discharge: No  Prescription Coverage: Yes     Domestic Abuse  Have you ever been the victim of abuse or violence?: No    Psychological Assessment  History of Substance Abuse: None  History of Psychiatric Problems: No  Non-compliant with Treatment: No  Newly Diagnosed Illness: No    Discharge Risks or Barriers  Discharge risks or barriers?: No    Anticipated Discharge Information  Discharge Disposition: Still a Patient (30)  Discharge Address: [address] [place] [address]  Discharge Contact Phone Number: [phone]

## 2020-12-17 NOTE — DISCHARGE SUMMARY
Discharge Summary    CHIEF COMPLAINT ON ADMISSION  Chief Complaint   Patient presents with   • Shortness of Breath     X months, worse this week with fatigue with ADL's, states possible COPD. Denies    • Pulmonary Embolism     Seen today on CT, sent to ER by MD. Dx with DVT two years ago LLE. Pt only on baby aspirin, no other thinners, previously on xarelto. States some chest 'tightness'   • Sent by MD     For PE       Reason for Admission  Sent by MD     Admission Date  12/16/2020    CODE STATUS  Full Code    HPI & HOSPITAL COURSE  This is a 80 y.o. female with h/o CLL, DVT and breast cx who presented for PE. She was started on therapeutic Lovenox and transitioned to Eliquis per oncology recommendations. She was hemodynamically stable on room air. Pain was controlled and she was ambulating to bathroom independently  with strong and steady gait.  I spoke with Dr. Cartwright, who was OK with patient being discharged today with instructions to follow-up with Dr. Garces outpatient. I educated both patient and patient's daughter, Indu, on the bleeding risks associated with Eliquis as well as signs and symptoms that warrant ED presentation.       Therefore, she is discharged in guarded and stable condition to home with close outpatient follow-up.    The patient recovered much more quickly than anticipated on admission.    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  No follow-up provider specified.    MEDICATIONS ON DISCHARGE     Medication List      START taking these medications      Instructions   * apixaban 5mg Tabs  Commonly known as: ELIQUIS   Take 2 Tabs by mouth 2 Times a Day for 7  "days. Indications: DVT/PE  Dose: 10 mg     * apixaban 5mg Tabs  Start taking on: December 24, 2020  Commonly known as: ELIQUIS   Take 1 Tab by mouth 2 Times a Day. Indications: DVT/PE  Dose: 5 mg         * This list has 2 medication(s) that are the same as other medications prescribed for you. Read the directions carefully, and ask your doctor or other care provider to review them with you.            CONTINUE taking these medications      Instructions   aspirin 81 MG Chew chewable tablet  Commonly known as: ASA   Take 81 mg by mouth every day.  Dose: 81 mg     fluticasone 50 MCG/ACT nasal spray  Commonly known as: FLONASE   Administer 2 Sprays into affected nostril(S) every day.  Dose: 2 Spray     loratadine 10 MG Tabs  Commonly known as: CLARITIN   Take 10 mg by mouth every day.  Dose: 10 mg        STOP taking these medications    azithromycin 250 MG Tabs  Commonly known as: ZITHROMAX     rivaroxaban 15 MG Tabs tablet  Commonly known as: Xarelto            Allergies  Allergies   Allergen Reactions   • Other Drug      B/p meds cause lightheadness and anxious.   • Other Misc      \"Antibiotics give me C DIFF\"   • Tape Contact Dermatitis       DIET  Orders Placed This Encounter   Procedures   • Diet Order Diet: Regular     Standing Status:   Standing     Number of Occurrences:   1     Order Specific Question:   Diet:     Answer:   Regular [1]       ACTIVITY  As tolerated.  Weight bearing as tolerated    CONSULTATIONS  Oncology     PROCEDURES  N/A    LABORATORY  Lab Results   Component Value Date    SODIUM 140 12/17/2020    POTASSIUM 4.5 12/17/2020    CHLORIDE 107 12/17/2020    CO2 25 12/17/2020    GLUCOSE 89 12/17/2020    BUN 19 12/17/2020    CREATININE 1.19 12/17/2020    CREATININE 0.94 05/30/2010    GLOMRATE NOT AVAIL. 05/30/2010        Lab Results   Component Value Date    WBC 19.9 (H) 12/17/2020    WBC 9.0 05/30/2010    HEMOGLOBIN 12.7 12/17/2020    HEMATOCRIT 38.2 12/17/2020    PLATELETCT 118 (L) 12/17/2020    "     Total time of the discharge process exceeds 35 minutes.

## 2020-12-17 NOTE — ED NOTES
Report from KULDIP Rogers. Pt sitting up in [facility], ambulated to BR w/ steady gait, NAD noted.

## 2020-12-17 NOTE — ED NOTES
Med rec updated and complete. Allergies reviewed. No antibiotic use in lats 14 days.      Home pharmacy [facility]

## 2020-12-17 NOTE — PROGRESS NOTES
Attending Hospitalist today is Sue BANEGAS starting at 0700. Please call this Physician for orders, updates and questions.

## 2020-12-17 NOTE — ASSESSMENT & PLAN NOTE
"Followed by outpatient oncologist, Dr. Garces.  Planned for PET, CT, and bone scan today.  This was halted due to findings of PE (see above).    CT chest/abd/pelvis today showed \"enlarged bilateral axillary, bilateral hilar, mediastinal, retroperitoneal, bilateral external iliac lymph nodes, concerning for lymphoma.\"  Oncology, Dr. Cartwright, onboard; will see patient tomorrow morning.  Further recs pending.   "

## 2020-12-17 NOTE — ED PROVIDER NOTES
"ED Provider Note    CHIEF COMPLAINT  Chief Complaint   Patient presents with   • Shortness of Breath     X months, worse this week with fatigue with ADL's, states possible COPD. Denies    • Pulmonary Embolism     Seen today on CT, sent to ER by MD. Dx with DVT two years ago LLE. Pt only on baby aspirin, no other thinners, previously on xarelto. States some chest 'tightness'   • Sent by MD     For PE     Here with her daughter    CAMACHO Rodriguez is a 80 y.o. female with factor V Leiden on baby aspirin daily, CLL and remote history of breast cancer who presents for being told she has a PE seen on CT today.    Patient states she has been short of breath \"for some time.\"  Her daughter states may be several months.  She states today she felt much worse but came to the hospital for outpatient imaging ordered by Dr. Garces.  She was supposed to have a PET scan, CT scan, and bone scan.  She was called in between her imaging, after her CT chest, and told that she has a PE and needs to go to the ER.    She reports tightness in her chest, currently present.  She is unable to report aggravating or alleviating factors.      Patient has history of DVT when she was being treated for breast cancer and on tamoxifen.  She took Xarelto for 5 years but was told she could discontinue it and has been on baby aspirin ever since.    ALLERGIES  Allergies   Allergen Reactions   • Other Drug      B/p meds cause lightheadness and anxious.   • Other Misc      \"Antibiotics give me C DIFF\"   • Tape Contact Dermatitis       CURRENT MEDICATIONS  Home Medications     Reviewed by Ryan Cerna on 12/16/20 at 1833  Med List Status: Complete   Medication Last Dose Status   aspirin (ASA) 81 MG Chew Tab chewable tablet 12/16/2020 Active   azithromycin (ZITHROMAX) 250 MG TABS Not Taking Active   azithromycin (ZITHROMAX) 250 MG TABS Not Taking Active   fluticasone (FLONASE) 50 MCG/ACT nasal spray 12/16/2020 Active   loratadine (CLARITIN) 10 MG " "Tab 2020 Active   rivaroxaban (XARELTO) 15 MG TABS tablet Not Taking Active                PAST MEDICAL HISTORY   has a past medical history of Arthritis, Blood clotting disorder (HCC), Bowel habit changes, Cancer (HCC) (), CATARACT (), Clostridium difficile infection (), Diabetes, Factor V deficiency (HCC), Gynecological disorder, Pain, Pneumonia, and Urinary bladder disorder.    SURGICAL HISTORY   has a past surgical history that includes breast biopsy (12); lumpectomy (2012); node biopsy sentinel (2012); axillary node dissection (2012); cataract extraction with iol (Bilateral, ); hysterectomy laparoscopy; meniscus repair (Right); and neuroma excision.    SOCIAL HISTORY  Social History     Tobacco Use   • Smoking status: Former Smoker     Packs/day: 2.00     Years: 33.00     Pack years: 66.00     Quit date: 1990     Years since quittin.9   • Smokeless tobacco: Never Used   Substance and Sexual Activity   • Alcohol use: No   • Drug use: No   • Sexual activity: Not on file       Family Hx:  DVT/PE  Factor V Leiden      REVIEW OF SYSTEMS  See HPI for further details.  All other systems are negative except as above in HPI.      PHYSICAL EXAM  VITAL SIGNS: /66   Pulse 76   Temp 36.3 °C (97.3 °F) (Temporal)   Resp (!) 22   Ht 1.676 m (5' 6\")   Wt 77.1 kg (170 lb)   SpO2 91%   BMI 27.44 kg/m²     General:  WDWN, nontoxic appearing in NAD; A+Ox3; V/S as above   Skin: warm and dry; good color; no rash  HEENT: NCAT; EOMs intact; PERRL; no scleral icterus   Neck: FROM; no JVD, no stridor  Cardiovascular: Regular heart rate and rhythm.  No murmurs, rubs, or gallops; pulses 2+ bilaterally radially  Lungs: Clear to auscultation with good air movement bilaterally.  No wheezes, rhonchi, or rales.   Abdomen: BS present; soft; NTND; no rebound, guarding, or rigidity.  No organomegaly or pulsatile mass  Extremities: MANZANO x 4; no e/o trauma; no pedal edema; neg " Oj's  Neurologic: CNs III-XII grossly intact; speech clear; distal sensation intact; strength 5/5 UE/LEs  Psychiatric: Appropriate affect, normal mood    LABS  Results for orders placed or performed during the hospital encounter of 20   Complete Metabolic Panel (CMP)   Result Value Ref Range    Sodium 134 (L) 135 - 145 mmol/L    Potassium 4.7 3.6 - 5.5 mmol/L    Chloride 101 96 - 112 mmol/L    Co2 21 20 - 33 mmol/L    Anion Gap 12.0 7.0 - 16.0    Glucose 97 65 - 99 mg/dL    Bun 18 8 - 22 mg/dL    Creatinine 1.06 0.50 - 1.40 mg/dL    Calcium 8.9 8.5 - 10.5 mg/dL    AST(SGOT) 18 12 - 45 U/L    ALT(SGPT) 13 2 - 50 U/L    Alkaline Phosphatase 76 30 - 99 U/L    Total Bilirubin 0.5 0.1 - 1.5 mg/dL    Albumin 4.1 3.2 - 4.9 g/dL    Total Protein 6.3 6.0 - 8.2 g/dL    Globulin 2.2 1.9 - 3.5 g/dL    A-G Ratio 1.9 g/dL   Troponin   Result Value Ref Range    Troponin T 22 (H) 6 - 19 ng/L   ESTIMATED GFR   Result Value Ref Range    GFR If African American >60 >60 mL/min/1.73 m 2    GFR If Non African American 50 (A) >60 mL/min/1.73 m 2   PTT   Result Value Ref Range    APTT 23.2 (L) 24.7 - 36.0 sec   Prothrombin Time   Result Value Ref Range    PT 13.5 12.0 - 14.6 sec    INR 1.00 0.87 - 1.13   COVID/SARS CoV-2 PCR    Specimen: Nasopharyngeal; Respirate   Result Value Ref Range    COVID Order Status Received    CoV-2, Flu A/B, And RSV by PCR   Result Value Ref Range    Influenza virus A RNA Negative Negative    Influenza virus B, PCR Negative Negative    RSV, PCR Negative Negative    SARS-CoV-2 by PCR NotDetected     SARS-CoV-2 Source NP Swab    EKG   Result Value Ref Range    Report       [facility] Emergency Dept.    Test Date:  2020  Pt Name:    BECKY ROSARIO                Department: ER  MRN:        [id number]                      Room:  Gender:     Female                       Technician: [id number]  :        [id number]                   Requested By:ER TRIAGE PROTOCOL  Order #:    [id number]                     Reading MD: LAKIA WALKER MD    Measurements  Intervals                                Axis  Rate:       91                           P:          83  AZ:         148                          QRS:        58  QRSD:       78                           T:          46  QT:         368  QTc:        453    Interpretive Statements  SINUS RHYTHM  ATRIAL PREMATURE COMPLEX  Compared to ECG 06/18/2012 12:23:40  Atrial premature complex(es) now present  ST (T wave) deviation no longer present  Electronically Signed On 12- 17:45:41 PST by LAKIA WALKER MD         MEDICAL RECORD  I have reviewed patient's medical record and pertinent results are listed below.    CT chest/abdomen/pelvis from 12/16/2020:  IMPRESSION:     1. Enlarged bilateral axillary, bilateral hilar, mediastinal, retroperitoneal, bilateral external iliac lymph nodes, concerning for lymphoma.     2. Nonocclusive thrombi in the left pulmonary arteries, likely subacute.     3. There are multiple tree-in-bud nodules in the inferior right middle lobe and lingula, worse on prior, this could relate to infection or inflammation.     4. Grossly similar 1.9 cm groundglass opacity in the left lower lobe. Similar mixed solid groundglass nodules in the right lung apex, measure 1.1 cm. These are nonspecific and could relate to infection/inflammation or low-grade malignancy. Continued follow-up is recommended.     COURSE & MEDICAL DECISION MAKING  I have reviewed any medical record information, laboratory studies and radiographic results as noted.    Nanette Rodriguez is a 80 y.o. female who presents complaining of SOB and dx of PE today on outpatient CT.    Appropriate PPE was worn at all times while interacting with the patient, including goggles, N95 mask, and surgical mask.    CT results reviewed, including diffuse lymphadenopathy, concerning for lymphoma.  Patient has a nonocclusive thrombus in the left pulmonary arteries.  These are likely  subacute.    Lovenox ordered    5:46 PM  Paging oncology    I discussed the case with Dr. Cartwright who is aware that the patient will be admitted and agrees with anticoagulation plan.    6:27 PM  Paging hospitalist for admission    Hospitalist agrees to admit the patient.    I contacted the patient's daughter to discuss the additional CT findings and the plan for chest pain/shortness of breath evaluation.  She is aware that I spoke with Dr. Cartwright.      FINAL IMPRESSION  1. Pulmonary embolism, unspecified chronicity, unspecified pulmonary embolism type, unspecified whether acute cor pulmonale present (HCC)     2. Chest pain, unspecified type     3. Shortness of breath              Electronically signed by: Katie Mesa M.D., 12/16/2020 5:18 PM

## 2020-12-17 NOTE — ASSESSMENT & PLAN NOTE
Initial troponin 22.  EKG showed sinus rhythm with APC present.  Patient without chest pain.  Likely secondary to demand ischemia.  Trend troponin, EKG x1.  Monitor on telemetry.

## 2020-12-17 NOTE — ASSESSMENT & PLAN NOTE
"\"Nonocclusive thrombi in the left pulmonary arteries, likely subacute,\" seen on CT chest/abdomen/pelvis today.  In the setting of present CLL and past h/o breast cancer, Factor V Leiden.    Oncology, Dr. Cartwright, onboard; recs: therapeutic Lovenox.  Dr. Cartwright to see patient tomorrow morning per EDP.   Therapeutic Lovenox.    "

## 2020-12-17 NOTE — ED NOTES
Pt has no unmet needs and has been updated on care. Hospitalist was just at bedside. Pt given daughter purse with personal belongings to take with her and only keep what is necessary for stay. Pt daughter is leaving at this time. VSS. Call light within reach and pt educated on how to use call light.

## 2020-12-17 NOTE — ED NOTES
Patient awake alert and oriented x 4, Glasc[place] 15, bed in low position, call light within reach, on room air, attached to cardiac monitor, unlabored breathing noted, no cough noted, interacts with staff, interactions noted as appropriate, EKG being performed at this time.

## 2020-12-18 ENCOUNTER — HOSPITAL ENCOUNTER (EMERGENCY)
Facility: MEDICAL CENTER | Age: 80
End: 2020-12-18
Attending: EMERGENCY MEDICINE
Payer: MEDICARE

## 2020-12-18 VITALS
SYSTOLIC BLOOD PRESSURE: 117 MMHG | WEIGHT: 169.97 LBS | RESPIRATION RATE: 19 BRPM | HEIGHT: 66 IN | DIASTOLIC BLOOD PRESSURE: 59 MMHG | TEMPERATURE: 98.3 F | BODY MASS INDEX: 27.32 KG/M2 | HEART RATE: 76 BPM | OXYGEN SATURATION: 95 %

## 2020-12-18 DIAGNOSIS — R10.9 ABDOMINAL WALL PAIN: ICD-10-CM

## 2020-12-18 DIAGNOSIS — N30.10 INTERSTITIAL CYSTITIS: ICD-10-CM

## 2020-12-18 DIAGNOSIS — T14.8XXA HEMATOMA: ICD-10-CM

## 2020-12-18 LAB
ALBUMIN SERPL BCP-MCNC: 3.6 G/DL (ref 3.2–4.9)
ALBUMIN/GLOB SERPL: 1.5 G/DL
ALP SERPL-CCNC: 76 U/L (ref 30–99)
ALT SERPL-CCNC: 10 U/L (ref 2–50)
ANION GAP SERPL CALC-SCNC: 15 MMOL/L (ref 7–16)
APPEARANCE UR: CLEAR
AST SERPL-CCNC: 16 U/L (ref 12–45)
BACTERIA #/AREA URNS HPF: ABNORMAL /HPF
BASOPHILS # BLD AUTO: 0 % (ref 0–1.8)
BASOPHILS # BLD: 0 K/UL (ref 0–0.12)
BILIRUB SERPL-MCNC: 0.7 MG/DL (ref 0.1–1.5)
BILIRUB UR QL STRIP.AUTO: NEGATIVE
BUN SERPL-MCNC: 22 MG/DL (ref 8–22)
CALCIUM SERPL-MCNC: 8.7 MG/DL (ref 8.4–10.2)
CHLORIDE SERPL-SCNC: 100 MMOL/L (ref 96–112)
CO2 SERPL-SCNC: 20 MMOL/L (ref 20–33)
COLOR UR: YELLOW
CREAT SERPL-MCNC: 1.39 MG/DL (ref 0.5–1.4)
EOSINOPHIL # BLD AUTO: 0 K/UL (ref 0–0.51)
EOSINOPHIL NFR BLD: 0 % (ref 0–6.9)
EPI CELLS #/AREA URNS HPF: ABNORMAL /HPF
ERYTHROCYTE [DISTWIDTH] IN BLOOD BY AUTOMATED COUNT: 49 FL (ref 35.9–50)
GLOBULIN SER CALC-MCNC: 2.4 G/DL (ref 1.9–3.5)
GLUCOSE SERPL-MCNC: 151 MG/DL (ref 65–99)
GLUCOSE UR STRIP.AUTO-MCNC: NEGATIVE MG/DL
HCT VFR BLD AUTO: 37 % (ref 37–47)
HGB BLD-MCNC: 12.1 G/DL (ref 12–16)
HYALINE CASTS #/AREA URNS LPF: ABNORMAL /LPF
KETONES UR STRIP.AUTO-MCNC: 15 MG/DL
LACTATE BLD-SCNC: 1.6 MMOL/L (ref 0.5–2)
LEUKOCYTE ESTERASE UR QL STRIP.AUTO: NEGATIVE
LYMPHOCYTES # BLD AUTO: 18.33 K/UL (ref 1–4.8)
LYMPHOCYTES NFR BLD: 77 % (ref 22–41)
MANUAL DIFF BLD: NORMAL
MCH RBC QN AUTO: 31.4 PG (ref 27–33)
MCHC RBC AUTO-ENTMCNC: 32.7 G/DL (ref 33.6–35)
MCV RBC AUTO: 96.1 FL (ref 81.4–97.8)
MICRO URNS: ABNORMAL
MONOCYTES # BLD AUTO: 0.95 K/UL (ref 0–0.85)
MONOCYTES NFR BLD AUTO: 4 % (ref 0–13.4)
MUCOUS THREADS #/AREA URNS HPF: ABNORMAL /HPF
NEUTROPHILS # BLD AUTO: 4.52 K/UL (ref 2–7.15)
NEUTROPHILS NFR BLD: 17 % (ref 44–72)
NEUTS BAND NFR BLD MANUAL: 2 % (ref 0–10)
NITRITE UR QL STRIP.AUTO: POSITIVE
NRBC # BLD AUTO: 0 K/UL
NRBC BLD-RTO: 0 /100 WBC
PH UR STRIP.AUTO: 5 [PH] (ref 5–8)
PLATELET # BLD AUTO: 151 K/UL (ref 164–446)
PLATELET BLD QL SMEAR: NORMAL
PMV BLD AUTO: 11.6 FL (ref 9–12.9)
POTASSIUM SERPL-SCNC: 4.6 MMOL/L (ref 3.6–5.5)
PROT SERPL-MCNC: 6 G/DL (ref 6–8.2)
PROT UR QL STRIP: NEGATIVE MG/DL
RBC # BLD AUTO: 3.85 M/UL (ref 4.2–5.4)
RBC # URNS HPF: ABNORMAL /HPF
RBC BLD AUTO: NORMAL
RBC UR QL AUTO: ABNORMAL
SMUDGE CELLS BLD QL SMEAR: NORMAL
SODIUM SERPL-SCNC: 135 MMOL/L (ref 135–145)
SP GR UR REFRACTOMETRY: 1.03
WBC # BLD AUTO: 23.8 K/UL (ref 4.8–10.8)
WBC #/AREA URNS HPF: ABNORMAL /HPF

## 2020-12-18 PROCEDURE — 81001 URINALYSIS AUTO W/SCOPE: CPT

## 2020-12-18 PROCEDURE — A9270 NON-COVERED ITEM OR SERVICE: HCPCS | Performed by: EMERGENCY MEDICINE

## 2020-12-18 PROCEDURE — 36415 COLL VENOUS BLD VENIPUNCTURE: CPT

## 2020-12-18 PROCEDURE — 85027 COMPLETE CBC AUTOMATED: CPT

## 2020-12-18 PROCEDURE — 85007 BL SMEAR W/DIFF WBC COUNT: CPT

## 2020-12-18 PROCEDURE — 700111 HCHG RX REV CODE 636 W/ 250 OVERRIDE (IP): Performed by: EMERGENCY MEDICINE

## 2020-12-18 PROCEDURE — 700102 HCHG RX REV CODE 250 W/ 637 OVERRIDE(OP): Performed by: EMERGENCY MEDICINE

## 2020-12-18 PROCEDURE — 83605 ASSAY OF LACTIC ACID: CPT

## 2020-12-18 PROCEDURE — 99284 EMERGENCY DEPT VISIT MOD MDM: CPT

## 2020-12-18 PROCEDURE — 80053 COMPREHEN METABOLIC PANEL: CPT

## 2020-12-18 RX ORDER — ONDANSETRON 2 MG/ML
4 INJECTION INTRAMUSCULAR; INTRAVENOUS ONCE
Status: DISCONTINUED | OUTPATIENT
Start: 2020-12-18 | End: 2020-12-18

## 2020-12-18 RX ORDER — SULFAMETHOXAZOLE AND TRIMETHOPRIM 800; 160 MG/1; MG/1
1 TABLET ORAL 2 TIMES DAILY
Qty: 6 TAB | Refills: 0 | Status: SHIPPED | OUTPATIENT
Start: 2020-12-18 | End: 2020-12-21

## 2020-12-18 RX ORDER — OXYCODONE HYDROCHLORIDE 5 MG/1
5 TABLET ORAL ONCE
Status: COMPLETED | OUTPATIENT
Start: 2020-12-18 | End: 2020-12-18

## 2020-12-18 RX ORDER — ONDANSETRON 4 MG/1
4 TABLET, ORALLY DISINTEGRATING ORAL ONCE
Status: COMPLETED | OUTPATIENT
Start: 2020-12-18 | End: 2020-12-18

## 2020-12-18 RX ADMIN — OXYCODONE HYDROCHLORIDE 5 MG: 5 TABLET ORAL at 03:38

## 2020-12-18 RX ADMIN — ONDANSETRON 4 MG: 4 TABLET, ORALLY DISINTEGRATING ORAL at 03:38

## 2020-12-18 ASSESSMENT — FIBROSIS 4 INDEX: FIB4 SCORE: 3.34

## 2020-12-18 NOTE — ED NOTES
All lines and monitors DC'd.  Discharge instructions given, questions answered.  Ambulated out of ER, escorted by RN.  Pt states all belongings in possession.  Instructed not to drive after pain medication and pt verbalizes understanding.  RX x0 given.     Pt sent with strict follow up instructions about appointments and how to take eliquis.    Pt seen by Dr. Moreno prior to being discharged.

## 2020-12-18 NOTE — DISCHARGE PLANNING
Meds-to-Beds: Pharmacy technician Pavithra delivered discharge prescription orders listed below to patient's bedside RN. Written information regarding the dispensed prescriptions was provided to the patient including the phone number of the pharmacy to call for any questions. Co-payment of $21.84 billed to patient account.     1700: Patient counseled on eliquis. Reviewed signs/symptoms of bleeding and when to seek medical attention. Reviewed potential drug-drug interactions. Patient verbalized understanding to take 2 tablets twice daily for 7 days, then start 1 tablet twice daily. Also consulted patient's daughter Indu over the phone per patient request. Patient agreeable to have co-payment billed to patient account.      Nanette Rodriguez   Home Medication Instructions JOSE:[id number]    Printed on:12/17/20 3993   Medication Information                      apixaban (ELIQUIS) 5mg Tab  Take 2 Tabs by mouth 2 Times a Day for 7 days. Indications: DVT/PE             apixaban (ELIQUIS) 5mg Tab  Take 1 Tab by mouth 2 Times a Day. Indications: DVT/PE               Yvette Sanchez, PharmD

## 2020-12-18 NOTE — ED PROVIDER NOTES
ED Provider Note    CHIEF COMPLAINT  Chief Complaint   Patient presents with   • Abdominal Pain       HPI  Patient is a 80-year-old female with a history of prior diagnosis of CLL, DVT and PE that was recently started on Eliquis who presents the emergency room for evaluation of abdominal pain.  The patient was discharged earlier this afternoon after being diagnosed with this new pulmonary embolus and left lower DVT.  At the areas where she had her Lovenox injections she is had some bruising but has had persistent and worsening pain that radiates throughout the right lower abdomen.  This is worse with palpation, ambulation and she has had decreased appetite.  She had a single nonbloody bowel movement while in the hospital earlier today but has not had any since discharge.  She had severe pain and associated nausea with several episodes of nonbilious and nonbloody emesis.  Since discharge she has not had any fevers, chills, chest pain, shortness of breath    PPE Note: I personally donned full PPE for all patient encounters during this visit, including being clean-shaven with an N95 respirator mask, gloves, and goggles.     REVIEW OF SYSTEMS  See HPI for further details. All other systems are negative.     PAST MEDICAL HISTORY   has a past medical history of Arthritis, Blood clotting disorder ([facility]), Bowel habit changes, Cancer ([facility]) (-), CATARACT (), Clostridium difficile infection (), Diabetes, Factor V deficiency ([facility]), Gynecological disorder, Pain, Pneumonia, and Urinary bladder disorder.    SOCIAL HISTORY  Social History     Tobacco Use   • Smoking status: Former Smoker     Packs/day: 2.00     Years: 33.00     Pack years: 66.00     Quit date: 1990     Years since quittin.9   • Smokeless tobacco: Never Used   Substance and Sexual Activity   • Alcohol use: No   • Drug use: No   • Sexual activity: Not on file       SURGICAL HISTORY   has a past surgical history that includes breast biopsy  "(06-06-12); lumpectomy (6/22/2012); node biopsy sentinel (6/22/2012); axillary node dissection (6/22/2012); cataract extraction with iol (Bilateral, 2008); hysterectomy laparoscopy; meniscus repair (Right); and neuroma excision.    CURRENT MEDICATIONS  Home Medications    **Home medications have not yet been reviewed for this encounter**       ALLERGIES  Allergies   Allergen Reactions   • Other Drug      B/p meds cause lightheadness and anxious.   • Other Misc      \"Antibiotics give me C DIFF\"   • Tape Contact Dermatitis     PHYSICAL EXAM  VITAL SIGNS: /61   Pulse 98   Temp 36.8 °C (98.3 °F) (Temporal)   Resp 19   Ht 1.676 m (5' 6\")   Wt 77.1 kg (169 lb 15.6 oz)   SpO2 95%   BMI 27.43 kg/m²   Pulse ox interpretation: I interpret this pulse ox as normal.  Genl: F sitting in gurney uncomfortably, speaking clearly, appears in mild distress   Head: NC/AT   ENT: Mucous membranes moist, posterior pharynx clear, uvula midline, nares patent bilaterally   Eyes: Normal sclera, pupils equal round reactive to light  Neck: Supple, FROM, no LAD appreciated  Pulmonary: Lungs are clear to auscultation bilaterally  Chest: No TTP  CV:  RRR, no murmur appreciated, pulses 2+ in both upper and lower extremities,  Abdomen: soft, tenderness in the right lower abdomen, positive McBurney's point tenderness, slight suprapubic discomfort, overlying bruising and tenderness in this additional distribution.  Negative Santiago sign, no left lower quadrant rebound.  Overall no guarding, no masses palpated, no HSM   : no CVA tenderness   Musculoskeletal: Pain free ROM of the neck. Moving upper and lower extremities and spontaneous in coordinated fashion  Neuro: A&Ox4 (person, place, time, situation), speech fluent, gait steady, no focal deficits appreciated  Psych: Patient has an appropriate affect and behavior  Skin: No rash or lesions.  No pallor or jaundice.  No cyanosis.  Warm and dry.     DIAGNOSTIC STUDIES / " PROCEDURES    LABS  Labs Reviewed   CBC WITH DIFFERENTIAL - Abnormal; Notable for the following components:       Result Value    WBC 23.8 (*)     RBC 3.85 (*)     MCHC 32.7 (*)     Platelet Count 151 (*)     Neutrophils-Polys 17.00 (*)     Lymphocytes 77.00 (*)     Lymphs (Absolute) 18.33 (*)     Monos (Absolute) 0.95 (*)     All other components within normal limits   COMP METABOLIC PANEL - Abnormal; Notable for the following components:    Glucose 151 (*)     All other components within normal limits   URINALYSIS - Abnormal; Notable for the following components:    Ketones 15 (*)     Nitrite Positive (*)     Occult Blood Small (*)     All other components within normal limits   ESTIMATED GFR - Abnormal; Notable for the following components:    GFR If  44 (*)     GFR If Non  37 (*)     All other components within normal limits   URINE MICROSCOPIC (W/UA) - Abnormal; Notable for the following components:    RBC 5-10 (*)     Bacteria Many (*)     All other components within normal limits   LACTIC ACID   DIFFERENTIAL MANUAL   PLATELET ESTIMATE   MORPHOLOGY   REFRACTOMETER SG     RADIOLOGY  No orders to display     COURSE & MEDICAL DECISION MAKING  Pertinent Labs & Imaging studies reviewed. (See chart for details)    DDX:  Colitis  UTI  Cholecystitis  Appendicitis  Diverticulitis  Neprolithiasis  Gastroenteritis  SBO/pSBO  Hematoma, seroma, contusion    MDM    Initial evaluation at 0150:  Presents emergency room for symptoms as described above.  The patient has some focalized abdominal tenderness in the area of her prior abdominal injections.  There is no warmth or active purulence and the distribution does not feel full.  This is where she had her prior injections at this point I was concerned about the possible underlying infectious etiology.  She had a CT scan prior to being discharged within the last several days at this point there was no evidence of underlying inflammatory changes  in the bowel, no abnormalities or other acute surgical complications.    Lab work obtained at this point shows that she has a leukocytosis, no gross metabolic abnormalities   Subacute on chronic renal dysfunction however these all appear to be somewhat baseline.  Leukocytosis of 23.8 does appear elevated from her previous draw several days ago however the patient's daughter has laboratory results have been taken over the last several months and shows that she has had baseline leukocyte elevations into the mid 20s without any acute symptomology's and has been attributed to her chronic CLL.    Serial abdominal exams are performed she does not have any focal progression into peritonitis, she feels improved with very minimal interventions and likely the overall pain is from a distribution of the seroma/hematoma in the abdominal wall.  Urinalysis shows that she has very slight ketones, nitrate positive but no leukocyte esterase.  Differential shows some bacteria and RBCs.  When discussed with the patient she has had problems with interstitial cystitis and is not currently on any medications.  The presence of nitrates with forming towards prescribed any it a short case of a have a ptosis.  No recent urine cultures are available and based on age I will place her on 3 days of Bactrim.    Patient has well-established, has follow-up appointments with her specialist and primary care physicians and feels comfortable going home at this time.  Questions addressed with the patient and family member and discharged home in stable condition.  FINAL IMPRESSION  Visit Diagnoses     ICD-10-CM   1. Abdominal wall pain  R10.9   2. Hematoma  T14.8XXA   3. Interstitial cystitis  N30.10          Electronically signed by: Balaji Agudelo M.D., 12/18/2020 1:50 AM

## 2020-12-18 NOTE — ED NOTES
Pt medicated per ERP orders. Pt able to ambulate to restroom to provide urine sample.    Slurred speech

## 2020-12-18 NOTE — ED NOTES
Received report for pt from Summer CHRISTIANSEN and Dr. Moreno.  Plan for pt to discharge after receiving Meds to Beds for elequis dosing.  APRN alerted pt's daughter of plan for discharge.    Pt c/o pain to R LQ.  She says the MD told her it was gas.  She says it does not improve.  Assisted pt to ambulate in hallway and to useBR.  No improvement.  This RN contacted [facility] to request an abdominal Xray and simethicone for gas.

## 2021-01-14 DIAGNOSIS — Z23 NEED FOR VACCINATION: ICD-10-CM

## 2023-08-21 NOTE — OR NURSING
1500 Pt over from IR post Right iliac bone lesion biopsy. Site CDI and soft, no signs of bleeding. Pt awake and alert, denies pain or nausea. VSS.   1515 Tolerating orals  1600 Pt resting comfortably no needs at this time  1610 Called pt's daughter, Zoe, to confirm .  1620 Pt ambulated to restroom tolerated well.  1630 Criteria met, posterior biopsy site CDI and soft, no signs of bleeding.  1640 Pt wheeled off of unit with all belongings by RN without incident. Discharge instructions provided to pt and daughter. Discussed diet, activity, follow up, medications and symptom management. Pt and daughter state understanding. Pt and daughter state all questions have been answered. Copy of discharge provided to pt and daughter.      show